# Patient Record
Sex: MALE | Race: WHITE | NOT HISPANIC OR LATINO
[De-identification: names, ages, dates, MRNs, and addresses within clinical notes are randomized per-mention and may not be internally consistent; named-entity substitution may affect disease eponyms.]

---

## 2024-10-25 ENCOUNTER — DASHBOARD ENCOUNTERS (OUTPATIENT)
Age: 59
End: 2024-10-25

## 2024-10-25 ENCOUNTER — OFFICE VISIT (OUTPATIENT)
Dept: RURAL CLINIC 2 | Facility: CLINIC | Age: 59
End: 2024-10-25
Payer: COMMERCIAL

## 2024-10-25 VITALS
WEIGHT: 163 LBS | HEIGHT: 70 IN | TEMPERATURE: 97.1 F | BODY MASS INDEX: 23.34 KG/M2 | DIASTOLIC BLOOD PRESSURE: 102 MMHG | HEART RATE: 77 BPM | SYSTOLIC BLOOD PRESSURE: 144 MMHG

## 2024-10-25 DIAGNOSIS — Z86.0100 PERSONAL HISTORY OF COLONIC POLYPS: ICD-10-CM

## 2024-10-25 DIAGNOSIS — K21.9 CHRONIC GERD: ICD-10-CM

## 2024-10-25 DIAGNOSIS — K65.4: ICD-10-CM

## 2024-10-25 PROCEDURE — 99243 OFF/OP CNSLTJ NEW/EST LOW 30: CPT | Performed by: NURSE PRACTITIONER

## 2024-10-25 RX ORDER — VONOPRAZAN FUMARATE 13.36 MG/1
1 TABLET TABLET ORAL ONCE A DAY
Status: ACTIVE | COMMUNITY

## 2024-10-25 NOTE — HPI-TODAY'S VISIT:
The pt is a 60 y/o gentleman who presents on referral from Dr. 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 A copy of this document to be sent to the referring provider. The pt underwent a Ct scan of the abdomen for RLQ pain with findings of mesenteric fibrosis and treated with prednisone taper x 3 weeks with complete resolution of his pain and hasn't had any further issues. He is up-to-date on screening colonoscopies. He has chronic GERD and takes Voquezna as needed with good control.

## 2024-10-27 PROBLEM — 235595009: Status: ACTIVE | Noted: 2024-10-27

## 2024-10-27 PROBLEM — 428283002: Status: ACTIVE | Noted: 2024-10-27

## 2024-10-27 PROBLEM — 1092381000119100: Status: ACTIVE | Noted: 2024-10-27
